# Patient Record
Sex: FEMALE | Race: WHITE | NOT HISPANIC OR LATINO
[De-identification: names, ages, dates, MRNs, and addresses within clinical notes are randomized per-mention and may not be internally consistent; named-entity substitution may affect disease eponyms.]

---

## 2018-09-11 ENCOUNTER — RECORDS - HEALTHEAST (OUTPATIENT)
Dept: ADMINISTRATIVE | Facility: OTHER | Age: 83
End: 2018-09-11

## 2018-10-11 ENCOUNTER — OFFICE VISIT - HEALTHEAST (OUTPATIENT)
Dept: ALLERGY | Facility: CLINIC | Age: 83
End: 2018-10-11

## 2018-10-11 DIAGNOSIS — L29.9 ITCHING: ICD-10-CM

## 2018-11-01 ENCOUNTER — OFFICE VISIT - HEALTHEAST (OUTPATIENT)
Dept: ALLERGY | Facility: CLINIC | Age: 83
End: 2018-11-01

## 2018-11-01 DIAGNOSIS — L30.9 ECZEMA: ICD-10-CM

## 2018-11-01 DIAGNOSIS — L29.9 PRURITUS: ICD-10-CM

## 2018-11-01 ASSESSMENT — MIFFLIN-ST. JEOR: SCORE: 827.41

## 2019-02-11 ENCOUNTER — RECORDS - HEALTHEAST (OUTPATIENT)
Dept: LAB | Facility: CLINIC | Age: 84
End: 2019-02-11

## 2019-02-11 LAB
ANION GAP SERPL CALCULATED.3IONS-SCNC: 6 MMOL/L (ref 5–18)
BUN SERPL-MCNC: 14 MG/DL (ref 8–28)
CALCIUM SERPL-MCNC: 8.3 MG/DL (ref 8.5–10.5)
CHLORIDE BLD-SCNC: 105 MMOL/L (ref 98–107)
CO2 SERPL-SCNC: 27 MMOL/L (ref 22–31)
CREAT SERPL-MCNC: 0.59 MG/DL (ref 0.6–1.1)
GFR SERPL CREATININE-BSD FRML MDRD: >60 ML/MIN/1.73M2
GLUCOSE BLD-MCNC: 77 MG/DL (ref 70–125)
MAGNESIUM SERPL-MCNC: 1.8 MG/DL (ref 1.8–2.6)
POTASSIUM BLD-SCNC: 3.7 MMOL/L (ref 3.5–5)
SODIUM SERPL-SCNC: 138 MMOL/L (ref 136–145)

## 2019-02-12 ENCOUNTER — RECORDS - HEALTHEAST (OUTPATIENT)
Dept: LAB | Facility: CLINIC | Age: 84
End: 2019-02-12

## 2019-02-13 LAB — POTASSIUM BLD-SCNC: 2.9 MMOL/L (ref 3.5–5)

## 2019-02-14 ENCOUNTER — RECORDS - HEALTHEAST (OUTPATIENT)
Dept: LAB | Facility: CLINIC | Age: 84
End: 2019-02-14

## 2019-02-15 LAB
ANION GAP SERPL CALCULATED.3IONS-SCNC: 10 MMOL/L (ref 5–18)
BUN SERPL-MCNC: 26 MG/DL (ref 8–28)
CALCIUM SERPL-MCNC: 9.6 MG/DL (ref 8.5–10.5)
CHLORIDE BLD-SCNC: 94 MMOL/L (ref 98–107)
CO2 SERPL-SCNC: 32 MMOL/L (ref 22–31)
CREAT SERPL-MCNC: 0.74 MG/DL (ref 0.6–1.1)
GFR SERPL CREATININE-BSD FRML MDRD: >60 ML/MIN/1.73M2
GLUCOSE BLD-MCNC: 86 MG/DL (ref 70–125)
POTASSIUM BLD-SCNC: 4 MMOL/L (ref 3.5–5)
SODIUM SERPL-SCNC: 136 MMOL/L (ref 136–145)

## 2019-02-18 ENCOUNTER — RECORDS - HEALTHEAST (OUTPATIENT)
Dept: LAB | Facility: CLINIC | Age: 84
End: 2019-02-18

## 2019-02-18 LAB
ANION GAP SERPL CALCULATED.3IONS-SCNC: 12 MMOL/L (ref 5–18)
BUN SERPL-MCNC: 30 MG/DL (ref 8–28)
CALCIUM SERPL-MCNC: 10 MG/DL (ref 8.5–10.5)
CHLORIDE BLD-SCNC: 81 MMOL/L (ref 98–107)
CO2 SERPL-SCNC: 39 MMOL/L (ref 22–31)
CREAT SERPL-MCNC: 0.81 MG/DL (ref 0.6–1.1)
GFR SERPL CREATININE-BSD FRML MDRD: >60 ML/MIN/1.73M2
GLUCOSE BLD-MCNC: 83 MG/DL (ref 70–125)
POTASSIUM BLD-SCNC: 3.6 MMOL/L (ref 3.5–5)
SODIUM SERPL-SCNC: 132 MMOL/L (ref 136–145)

## 2019-02-20 ENCOUNTER — RECORDS - HEALTHEAST (OUTPATIENT)
Dept: LAB | Facility: CLINIC | Age: 84
End: 2019-02-20

## 2019-02-20 LAB — POTASSIUM BLD-SCNC: 3.3 MMOL/L (ref 3.5–5)

## 2019-02-21 ENCOUNTER — RECORDS - HEALTHEAST (OUTPATIENT)
Dept: LAB | Facility: CLINIC | Age: 84
End: 2019-02-21

## 2019-02-21 LAB
ANION GAP SERPL CALCULATED.3IONS-SCNC: 19 MMOL/L (ref 5–18)
BUN SERPL-MCNC: 64 MG/DL (ref 8–28)
CALCIUM SERPL-MCNC: 10.7 MG/DL (ref 8.5–10.5)
CHLORIDE BLD-SCNC: 76 MMOL/L (ref 98–107)
CO2 SERPL-SCNC: 39 MMOL/L (ref 22–31)
CREAT SERPL-MCNC: 1.32 MG/DL (ref 0.6–1.1)
ERYTHROCYTE [DISTWIDTH] IN BLOOD BY AUTOMATED COUNT: 16.5 % (ref 11–14.5)
GFR SERPL CREATININE-BSD FRML MDRD: 38 ML/MIN/1.73M2
GLUCOSE BLD-MCNC: 103 MG/DL (ref 70–125)
HCT VFR BLD AUTO: 45.9 % (ref 35–47)
HGB BLD-MCNC: 14.9 G/DL (ref 12–16)
MCH RBC QN AUTO: 31.1 PG (ref 27–34)
MCHC RBC AUTO-ENTMCNC: 32.5 G/DL (ref 32–36)
MCV RBC AUTO: 96 FL (ref 80–100)
PLATELET # BLD AUTO: 353 THOU/UL (ref 140–440)
PMV BLD AUTO: 10.8 FL (ref 8.5–12.5)
POTASSIUM BLD-SCNC: 3.5 MMOL/L (ref 3.5–5)
RBC # BLD AUTO: 4.79 MILL/UL (ref 3.8–5.4)
SODIUM SERPL-SCNC: 134 MMOL/L (ref 136–145)
WBC: 13.1 THOU/UL (ref 4–11)

## 2019-02-22 ENCOUNTER — RECORDS - HEALTHEAST (OUTPATIENT)
Dept: LAB | Facility: CLINIC | Age: 84
End: 2019-02-22

## 2019-02-22 LAB
ALBUMIN UR-MCNC: ABNORMAL MG/DL
APPEARANCE UR: ABNORMAL
BACTERIA #/AREA URNS HPF: ABNORMAL HPF
BILIRUB UR QL STRIP: NEGATIVE
C DIFF TOX B STL QL: NEGATIVE
COLOR UR AUTO: YELLOW
GLUCOSE UR STRIP-MCNC: NEGATIVE MG/DL
HGB UR QL STRIP: NEGATIVE
KETONES UR STRIP-MCNC: NEGATIVE MG/DL
LEUKOCYTE ESTERASE UR QL STRIP: ABNORMAL
NITRATE UR QL: NEGATIVE
PH UR STRIP: 8.5 [PH] (ref 4.5–8)
POTASSIUM BLD-SCNC: 2.7 MMOL/L (ref 3.5–5)
RBC #/AREA URNS AUTO: ABNORMAL HPF
RIBOTYPE 027/NAP1/BI: NORMAL
SP GR UR STRIP: 1.01 (ref 1–1.03)
SQUAMOUS #/AREA URNS AUTO: ABNORMAL LPF
TRANS CELLS #/AREA URNS HPF: ABNORMAL LPF
UROBILINOGEN UR STRIP-ACNC: ABNORMAL
WBC #/AREA URNS AUTO: ABNORMAL HPF

## 2019-02-24 LAB
BACTERIA SPEC CULT: ABNORMAL
BACTERIA SPEC CULT: ABNORMAL

## 2019-03-31 ENCOUNTER — RECORDS - HEALTHEAST (OUTPATIENT)
Dept: LAB | Facility: CLINIC | Age: 84
End: 2019-03-31

## 2019-03-31 LAB
ALBUMIN UR-MCNC: ABNORMAL MG/DL
AMORPH CRY #/AREA URNS HPF: ABNORMAL /[HPF]
APPEARANCE UR: ABNORMAL
BACTERIA #/AREA URNS HPF: ABNORMAL HPF
BILIRUB UR QL STRIP: NEGATIVE
COLOR UR AUTO: YELLOW
GLUCOSE UR STRIP-MCNC: NEGATIVE MG/DL
HGB UR QL STRIP: ABNORMAL
KETONES UR STRIP-MCNC: NEGATIVE MG/DL
LEUKOCYTE ESTERASE UR QL STRIP: ABNORMAL
MUCOUS THREADS #/AREA URNS LPF: ABNORMAL LPF
NITRATE UR QL: POSITIVE
PH UR STRIP: 8.5 [PH] (ref 4.5–8)
RBC #/AREA URNS AUTO: ABNORMAL HPF
RENAL EPI CELLS #/AREA URNS HPF: ABNORMAL LPF
SP GR UR STRIP: 1.02 (ref 1–1.03)
SQUAMOUS #/AREA URNS AUTO: ABNORMAL LPF
TRANS CELLS #/AREA URNS HPF: ABNORMAL LPF
UROBILINOGEN UR STRIP-ACNC: ABNORMAL
WBC #/AREA URNS AUTO: >100 HPF
WBC CLUMPS #/AREA URNS HPF: PRESENT /[HPF]

## 2019-04-02 LAB — BACTERIA SPEC CULT: ABNORMAL

## 2019-04-04 ENCOUNTER — RECORDS - HEALTHEAST (OUTPATIENT)
Dept: LAB | Facility: CLINIC | Age: 84
End: 2019-04-04

## 2019-04-05 LAB
ALBUMIN SERPL-MCNC: 3.3 G/DL (ref 3.5–5)
ALP SERPL-CCNC: 103 U/L (ref 45–120)
ALT SERPL W P-5'-P-CCNC: 14 U/L (ref 0–45)
ANION GAP SERPL CALCULATED.3IONS-SCNC: 11 MMOL/L (ref 5–18)
AST SERPL W P-5'-P-CCNC: 25 U/L (ref 0–40)
BILIRUB DIRECT SERPL-MCNC: 0.1 MG/DL
BILIRUB SERPL-MCNC: 0.3 MG/DL (ref 0–1)
BUN SERPL-MCNC: 21 MG/DL (ref 8–28)
CALCIUM SERPL-MCNC: 9.4 MG/DL (ref 8.5–10.5)
CHLORIDE BLD-SCNC: 98 MMOL/L (ref 98–107)
CO2 SERPL-SCNC: 27 MMOL/L (ref 22–31)
CREAT SERPL-MCNC: 0.84 MG/DL (ref 0.6–1.1)
ERYTHROCYTE [DISTWIDTH] IN BLOOD BY AUTOMATED COUNT: 15.7 % (ref 11–14.5)
GFR SERPL CREATININE-BSD FRML MDRD: >60 ML/MIN/1.73M2
GLUCOSE BLD-MCNC: 79 MG/DL (ref 70–125)
HCT VFR BLD AUTO: 35.6 % (ref 35–47)
HGB BLD-MCNC: 11.5 G/DL (ref 12–16)
MAGNESIUM SERPL-MCNC: 1.8 MG/DL (ref 1.8–2.6)
MCH RBC QN AUTO: 31.9 PG (ref 27–34)
MCHC RBC AUTO-ENTMCNC: 32.3 G/DL (ref 32–36)
MCV RBC AUTO: 99 FL (ref 80–100)
PLATELET # BLD AUTO: 347 THOU/UL (ref 140–440)
PMV BLD AUTO: 10.4 FL (ref 8.5–12.5)
POTASSIUM BLD-SCNC: 3.8 MMOL/L (ref 3.5–5)
PROT SERPL-MCNC: 6.7 G/DL (ref 6–8)
RBC # BLD AUTO: 3.61 MILL/UL (ref 3.8–5.4)
SODIUM SERPL-SCNC: 136 MMOL/L (ref 136–145)
TSH SERPL DL<=0.005 MIU/L-ACNC: 3.56 UIU/ML (ref 0.3–5)
VIT B12 SERPL-MCNC: 591 PG/ML (ref 213–816)
WBC: 8.5 THOU/UL (ref 4–11)

## 2019-06-13 ENCOUNTER — RECORDS - HEALTHEAST (OUTPATIENT)
Dept: LAB | Facility: CLINIC | Age: 84
End: 2019-06-13

## 2019-06-13 LAB — ALBUMIN SERPL-MCNC: 3.1 G/DL (ref 3.5–5)

## 2019-07-31 ENCOUNTER — RECORDS - HEALTHEAST (OUTPATIENT)
Dept: ADMINISTRATIVE | Facility: OTHER | Age: 84
End: 2019-07-31

## 2019-08-03 ENCOUNTER — RECORDS - HEALTHEAST (OUTPATIENT)
Dept: LAB | Facility: CLINIC | Age: 84
End: 2019-08-03

## 2019-08-03 LAB
ALBUMIN UR-MCNC: ABNORMAL MG/DL
AMORPH CRY #/AREA URNS HPF: ABNORMAL /[HPF]
APPEARANCE UR: ABNORMAL
BACTERIA #/AREA URNS HPF: ABNORMAL HPF
BILIRUB UR QL STRIP: NEGATIVE
COLOR UR AUTO: YELLOW
GLUCOSE UR STRIP-MCNC: NEGATIVE MG/DL
HGB UR QL STRIP: ABNORMAL
KETONES UR STRIP-MCNC: NEGATIVE MG/DL
LEUKOCYTE ESTERASE UR QL STRIP: ABNORMAL
MUCOUS THREADS #/AREA URNS LPF: ABNORMAL LPF
NITRATE UR QL: POSITIVE
PH UR STRIP: 8.5 [PH] (ref 4.5–8)
RBC #/AREA URNS AUTO: ABNORMAL HPF
SP GR UR STRIP: 1.02 (ref 1–1.03)
SQUAMOUS #/AREA URNS AUTO: ABNORMAL LPF
TRI-PHOS CRY #/AREA URNS HPF: PRESENT /[HPF]
UROBILINOGEN UR STRIP-ACNC: ABNORMAL
WBC #/AREA URNS AUTO: ABNORMAL HPF
WBC CLUMPS #/AREA URNS HPF: PRESENT /[HPF]

## 2019-08-05 LAB — BACTERIA SPEC CULT: ABNORMAL

## 2019-09-12 ENCOUNTER — RECORDS - HEALTHEAST (OUTPATIENT)
Dept: LAB | Facility: CLINIC | Age: 84
End: 2019-09-12

## 2019-09-12 LAB
ANION GAP SERPL CALCULATED.3IONS-SCNC: 14 MMOL/L (ref 5–18)
BUN SERPL-MCNC: 33 MG/DL (ref 8–28)
CALCIUM SERPL-MCNC: 10.3 MG/DL (ref 8.5–10.5)
CHLORIDE BLD-SCNC: 91 MMOL/L (ref 98–107)
CO2 SERPL-SCNC: 36 MMOL/L (ref 22–31)
CREAT SERPL-MCNC: 1.07 MG/DL (ref 0.6–1.1)
ERYTHROCYTE [DISTWIDTH] IN BLOOD BY AUTOMATED COUNT: 15.2 % (ref 11–14.5)
GFR SERPL CREATININE-BSD FRML MDRD: 48 ML/MIN/1.73M2
GLUCOSE BLD-MCNC: 116 MG/DL (ref 70–125)
HCT VFR BLD AUTO: 43.8 % (ref 35–47)
HGB BLD-MCNC: 13.7 G/DL (ref 12–16)
MAGNESIUM SERPL-MCNC: 2.4 MG/DL (ref 1.8–2.6)
MCH RBC QN AUTO: 32.1 PG (ref 27–34)
MCHC RBC AUTO-ENTMCNC: 31.3 G/DL (ref 32–36)
MCV RBC AUTO: 103 FL (ref 80–100)
PLATELET # BLD AUTO: 367 THOU/UL (ref 140–440)
PMV BLD AUTO: 10.4 FL (ref 8.5–12.5)
POTASSIUM BLD-SCNC: 3.2 MMOL/L (ref 3.5–5)
RBC # BLD AUTO: 4.27 MILL/UL (ref 3.8–5.4)
SODIUM SERPL-SCNC: 141 MMOL/L (ref 136–145)
TSH SERPL DL<=0.005 MIU/L-ACNC: 1.93 UIU/ML (ref 0.3–5)
VIT B12 SERPL-MCNC: >2000 PG/ML (ref 213–816)
WBC: 10.9 THOU/UL (ref 4–11)

## 2019-10-29 ENCOUNTER — RECORDS - HEALTHEAST (OUTPATIENT)
Dept: LAB | Facility: CLINIC | Age: 84
End: 2019-10-29

## 2019-10-29 LAB
ANION GAP SERPL CALCULATED.3IONS-SCNC: 16 MMOL/L (ref 5–18)
BUN SERPL-MCNC: 45 MG/DL (ref 8–28)
CALCIUM SERPL-MCNC: 9.5 MG/DL (ref 8.5–10.5)
CHLORIDE BLD-SCNC: 80 MMOL/L (ref 98–107)
CO2 SERPL-SCNC: 41 MMOL/L (ref 22–31)
CREAT SERPL-MCNC: 1.35 MG/DL (ref 0.6–1.1)
GFR SERPL CREATININE-BSD FRML MDRD: 37 ML/MIN/1.73M2
GLUCOSE BLD-MCNC: 132 MG/DL (ref 70–125)
POTASSIUM BLD-SCNC: 3 MMOL/L (ref 3.5–5)
SODIUM SERPL-SCNC: 137 MMOL/L (ref 136–145)

## 2020-06-16 ENCOUNTER — RECORDS - HEALTHEAST (OUTPATIENT)
Dept: ADMINISTRATIVE | Facility: OTHER | Age: 85
End: 2020-06-16
Payer: MEDICARE

## 2020-06-25 ENCOUNTER — RECORDS - HEALTHEAST (OUTPATIENT)
Dept: LAB | Facility: CLINIC | Age: 85
End: 2020-06-25

## 2020-06-26 LAB
ANION GAP SERPL CALCULATED.3IONS-SCNC: 14 MMOL/L (ref 5–18)
BUN SERPL-MCNC: 48 MG/DL (ref 8–28)
CALCIUM SERPL-MCNC: 9.8 MG/DL (ref 8.5–10.5)
CHLORIDE BLD-SCNC: 83 MMOL/L (ref 98–107)
CO2 SERPL-SCNC: 38 MMOL/L (ref 22–31)
CREAT SERPL-MCNC: 1.69 MG/DL (ref 0.6–1.1)
ERYTHROCYTE [DISTWIDTH] IN BLOOD BY AUTOMATED COUNT: 14.4 % (ref 11–14.5)
GFR SERPL CREATININE-BSD FRML MDRD: 28 ML/MIN/1.73M2
GLUCOSE BLD-MCNC: 97 MG/DL (ref 70–125)
HCT VFR BLD AUTO: 37.6 % (ref 35–47)
HGB BLD-MCNC: 11.8 G/DL (ref 12–16)
MAGNESIUM SERPL-MCNC: 3.4 MG/DL (ref 1.8–2.6)
MCH RBC QN AUTO: 31.6 PG (ref 27–34)
MCHC RBC AUTO-ENTMCNC: 31.4 G/DL (ref 32–36)
MCV RBC AUTO: 101 FL (ref 80–100)
PLATELET # BLD AUTO: 456 THOU/UL (ref 140–440)
PMV BLD AUTO: 9.6 FL (ref 8.5–12.5)
POTASSIUM BLD-SCNC: 2.8 MMOL/L (ref 3.5–5)
RBC # BLD AUTO: 3.73 MILL/UL (ref 3.8–5.4)
SODIUM SERPL-SCNC: 135 MMOL/L (ref 136–145)
TSH SERPL DL<=0.005 MIU/L-ACNC: 2.11 UIU/ML (ref 0.3–5)
VIT B12 SERPL-MCNC: 1831 PG/ML (ref 213–816)
WBC: 10.4 THOU/UL (ref 4–11)

## 2020-07-02 ENCOUNTER — RECORDS - HEALTHEAST (OUTPATIENT)
Dept: LAB | Facility: CLINIC | Age: 85
End: 2020-07-02

## 2020-07-03 LAB
ANION GAP SERPL CALCULATED.3IONS-SCNC: 10 MMOL/L (ref 5–18)
BUN SERPL-MCNC: 27 MG/DL (ref 8–28)
CALCIUM SERPL-MCNC: 9.6 MG/DL (ref 8.5–10.5)
CHLORIDE BLD-SCNC: 90 MMOL/L (ref 98–107)
CO2 SERPL-SCNC: 36 MMOL/L (ref 22–31)
CREAT SERPL-MCNC: 0.98 MG/DL (ref 0.6–1.1)
GFR SERPL CREATININE-BSD FRML MDRD: 53 ML/MIN/1.73M2
GLUCOSE BLD-MCNC: 80 MG/DL (ref 70–125)
POTASSIUM BLD-SCNC: 2.7 MMOL/L (ref 3.5–5)
SODIUM SERPL-SCNC: 136 MMOL/L (ref 136–145)

## 2020-07-05 LAB — ZINC SERPL-MCNC: 80.8 UG/DL (ref 60–120)

## 2020-07-09 ENCOUNTER — RECORDS - HEALTHEAST (OUTPATIENT)
Dept: LAB | Facility: CLINIC | Age: 85
End: 2020-07-09

## 2020-07-10 LAB — POTASSIUM BLD-SCNC: 2.3 MMOL/L (ref 3.5–5)

## 2020-07-25 ENCOUNTER — RECORDS - HEALTHEAST (OUTPATIENT)
Dept: LAB | Facility: CLINIC | Age: 85
End: 2020-07-25

## 2020-07-27 LAB — POTASSIUM BLD-SCNC: 3.8 MMOL/L (ref 3.5–5)

## 2020-08-07 ENCOUNTER — RECORDS - HEALTHEAST (OUTPATIENT)
Dept: ADMINISTRATIVE | Facility: OTHER | Age: 85
End: 2020-08-07

## 2020-08-07 ENCOUNTER — AMBULATORY - HEALTHEAST (OUTPATIENT)
Dept: VASCULAR SURGERY | Facility: CLINIC | Age: 85
End: 2020-08-07

## 2020-08-07 DIAGNOSIS — S81.809A WOUND OF LOWER EXTREMITY: ICD-10-CM

## 2020-08-24 ENCOUNTER — COMMUNICATION - HEALTHEAST (OUTPATIENT)
Dept: VASCULAR SURGERY | Facility: CLINIC | Age: 85
End: 2020-08-24

## 2020-08-24 ENCOUNTER — OFFICE VISIT - HEALTHEAST (OUTPATIENT)
Dept: VASCULAR SURGERY | Facility: CLINIC | Age: 85
End: 2020-08-24

## 2020-08-24 DIAGNOSIS — L97.921 SKIN ULCER OF LEFT LOWER LEG, LIMITED TO BREAKDOWN OF SKIN (H): ICD-10-CM

## 2020-08-24 ASSESSMENT — MIFFLIN-ST. JEOR: SCORE: 797.36

## 2020-09-03 ENCOUNTER — COMMUNICATION - HEALTHEAST (OUTPATIENT)
Dept: VASCULAR SURGERY | Facility: CLINIC | Age: 85
End: 2020-09-03

## 2020-09-09 ENCOUNTER — RECORDS - HEALTHEAST (OUTPATIENT)
Dept: LAB | Facility: CLINIC | Age: 85
End: 2020-09-09

## 2020-09-10 LAB — POTASSIUM BLD-SCNC: 5 MMOL/L (ref 3.5–5)

## 2020-09-16 ENCOUNTER — OFFICE VISIT - HEALTHEAST (OUTPATIENT)
Dept: VASCULAR SURGERY | Facility: CLINIC | Age: 85
End: 2020-09-16

## 2020-09-16 DIAGNOSIS — L97.921 SKIN ULCER OF LEFT LOWER LEG, LIMITED TO BREAKDOWN OF SKIN (H): ICD-10-CM

## 2020-09-23 ENCOUNTER — OFFICE VISIT - HEALTHEAST (OUTPATIENT)
Dept: VASCULAR SURGERY | Facility: CLINIC | Age: 85
End: 2020-09-23

## 2020-09-23 DIAGNOSIS — E88.09 HYPOALBUMINEMIA: ICD-10-CM

## 2020-09-23 DIAGNOSIS — R63.6 UNDERWEIGHT: ICD-10-CM

## 2020-09-23 DIAGNOSIS — S81.801D TRAUMATIC OPEN WOUND OF LOWER LEG, RIGHT, SUBSEQUENT ENCOUNTER: ICD-10-CM

## 2020-09-23 DIAGNOSIS — L97.921 SKIN ULCER OF LEFT LOWER LEG, LIMITED TO BREAKDOWN OF SKIN (H): ICD-10-CM

## 2020-09-23 DIAGNOSIS — R54 ADVANCED AGE: ICD-10-CM

## 2020-09-25 ENCOUNTER — RECORDS - HEALTHEAST (OUTPATIENT)
Dept: LAB | Facility: CLINIC | Age: 85
End: 2020-09-25

## 2020-09-28 LAB — POTASSIUM BLD-SCNC: 3.2 MMOL/L (ref 3.5–5)

## 2020-10-08 ENCOUNTER — RECORDS - HEALTHEAST (OUTPATIENT)
Dept: LAB | Facility: CLINIC | Age: 85
End: 2020-10-08

## 2020-10-09 LAB — POTASSIUM BLD-SCNC: 4 MMOL/L (ref 3.5–5)

## 2020-10-14 ENCOUNTER — RECORDS - HEALTHEAST (OUTPATIENT)
Dept: LAB | Facility: CLINIC | Age: 85
End: 2020-10-14

## 2020-10-15 LAB — POTASSIUM BLD-SCNC: 3.8 MMOL/L (ref 3.5–5)

## 2020-10-23 ENCOUNTER — OFFICE VISIT - HEALTHEAST (OUTPATIENT)
Dept: VASCULAR SURGERY | Facility: CLINIC | Age: 85
End: 2020-10-23

## 2020-10-23 DIAGNOSIS — L97.921 SKIN ULCER OF LEFT LOWER LEG, LIMITED TO BREAKDOWN OF SKIN (H): ICD-10-CM

## 2020-10-23 DIAGNOSIS — E88.09 HYPOALBUMINEMIA: ICD-10-CM

## 2020-10-23 DIAGNOSIS — R54 ADVANCED AGE: ICD-10-CM

## 2020-10-23 DIAGNOSIS — S81.801D TRAUMATIC OPEN WOUND OF LOWER LEG, RIGHT, SUBSEQUENT ENCOUNTER: ICD-10-CM

## 2020-10-23 DIAGNOSIS — R63.6 UNDERWEIGHT: ICD-10-CM

## 2020-12-16 ENCOUNTER — RECORDS - HEALTHEAST (OUTPATIENT)
Dept: LAB | Facility: CLINIC | Age: 85
End: 2020-12-16

## 2020-12-17 LAB — POTASSIUM BLD-SCNC: 3.1 MMOL/L (ref 3.5–5)

## 2021-01-21 ENCOUNTER — RECORDS - HEALTHEAST (OUTPATIENT)
Dept: LAB | Facility: CLINIC | Age: 86
End: 2021-01-21

## 2021-01-22 LAB — POTASSIUM BLD-SCNC: 3.3 MMOL/L (ref 3.5–5)

## 2021-03-25 ENCOUNTER — RECORDS - HEALTHEAST (OUTPATIENT)
Dept: ADMINISTRATIVE | Facility: OTHER | Age: 86
End: 2021-03-25

## 2021-03-25 ENCOUNTER — RECORDS - HEALTHEAST (OUTPATIENT)
Dept: LAB | Facility: CLINIC | Age: 86
End: 2021-03-25

## 2021-03-26 LAB — POTASSIUM BLD-SCNC: 2.9 MMOL/L (ref 3.5–5)

## 2021-03-30 ENCOUNTER — RECORDS - HEALTHEAST (OUTPATIENT)
Dept: LAB | Facility: CLINIC | Age: 86
End: 2021-03-30

## 2021-04-01 LAB — POTASSIUM BLD-SCNC: 2.7 MMOL/L (ref 3.5–5)

## 2021-04-02 ENCOUNTER — RECORDS - HEALTHEAST (OUTPATIENT)
Dept: LAB | Facility: CLINIC | Age: 86
End: 2021-04-02

## 2021-04-05 LAB — POTASSIUM BLD-SCNC: 2.6 MMOL/L (ref 3.5–5)

## 2021-05-05 ENCOUNTER — RECORDS - HEALTHEAST (OUTPATIENT)
Dept: LAB | Facility: CLINIC | Age: 86
End: 2021-05-05

## 2021-05-06 LAB — POTASSIUM BLD-SCNC: 3.4 MMOL/L (ref 3.5–5)

## 2021-05-21 ENCOUNTER — RECORDS - HEALTHEAST (OUTPATIENT)
Dept: ADMINISTRATIVE | Facility: OTHER | Age: 86
End: 2021-05-21

## 2021-05-26 VITALS — SYSTOLIC BLOOD PRESSURE: 116 MMHG | DIASTOLIC BLOOD PRESSURE: 62 MMHG

## 2021-06-02 VITALS — BODY MASS INDEX: 18.58 KG/M2 | WEIGHT: 101 LBS | HEIGHT: 62 IN

## 2021-06-02 VITALS — WEIGHT: 101.1 LBS | BODY MASS INDEX: 16.95 KG/M2

## 2021-06-04 VITALS
HEIGHT: 62 IN | BODY MASS INDEX: 17.21 KG/M2 | WEIGHT: 93.5 LBS | DIASTOLIC BLOOD PRESSURE: 50 MMHG | HEART RATE: 88 BPM | RESPIRATION RATE: 14 BRPM | TEMPERATURE: 98.1 F | SYSTOLIC BLOOD PRESSURE: 106 MMHG

## 2021-06-05 VITALS
TEMPERATURE: 99 F | RESPIRATION RATE: 14 BRPM | BODY MASS INDEX: 16.1 KG/M2 | SYSTOLIC BLOOD PRESSURE: 126 MMHG | WEIGHT: 88 LBS | HEART RATE: 88 BPM | DIASTOLIC BLOOD PRESSURE: 66 MMHG

## 2021-06-05 VITALS
HEART RATE: 84 BPM | RESPIRATION RATE: 16 BRPM | TEMPERATURE: 97.8 F | DIASTOLIC BLOOD PRESSURE: 68 MMHG | BODY MASS INDEX: 17.88 KG/M2 | SYSTOLIC BLOOD PRESSURE: 118 MMHG | WEIGHT: 97.78 LBS

## 2021-06-10 NOTE — TELEPHONE ENCOUNTER
Writer called pt but could only leave a message for a call back to schedule a 40 min consult with Hazel Cho CNP. Phone number provided.

## 2021-06-10 NOTE — TELEPHONE ENCOUNTER
----- Message from Carolyn Randle RN sent at 8/24/2020  2:25 PM CDT -----  Regarding: Schedule  Please schedule patient with Hazel Cho per VS.  He will follow patient until this can be set up. Thanks.

## 2021-06-10 NOTE — PATIENT INSTRUCTIONS - HE
Limited weightbearing on left foot.    Start Santyl on left posterior leg per directions below:    How to Use Collagenase  SANTYL  Ointment    DAILY CLEANSE  Gently cleanse the wound with sterile saline    APPLY  Apply SANTYL  Ointment at the thickness of a nickel or the thickness of the cream inside an Oreo cookie    COVER  Cover the wound with a clean moistened gauze followed by dry gauze if wound bed is dry. Wounds with sufficient exudate will naturally activate the collagenase enzyme and do not need a moistened gauze applied. Do not use dressings that contain silver or iodine with SANTYL  Ointment, as they may stop SANTYL  Ointment       [facility] Virtual Check-In Number    [phone]    When you arrive for your IN OFFICE visit. Please call this number from the parking lot.      The staff will then virtually check you in and meet you at the door to escort you to a room.    If you arrive early and a room is not yet ready for you staff may have you wait can call you back when they are ready.     Please remember to wear a mask into your appointment     No Visitors Allowed    If you are having any symptoms- cough, fever, rash, loss of taste and smell or shortness of breath we ask that you please call and reschedule your appointment.

## 2021-06-11 NOTE — TELEPHONE ENCOUNTER
Writer spoke to pt today to assist scheduling an appt with Hazel Cho CNP. Pt will be seeing Hazel Cho October 5th and has been placed on the cancelation list. Pt thanked writer for the call today.

## 2021-06-11 NOTE — TELEPHONE ENCOUNTER
Spoke to Karina. They needed the wound size in order to determine how much Santyl to provide with first dispensing. Provided wound measurements. They will adjust the amount to dispense from 30 gms to 90 gms with 2 additional refills. No further information is needed.

## 2021-06-11 NOTE — PATIENT INSTRUCTIONS - HE
Limited weightbearing on left foot.     Continue Santyl on left posterior leg and start santyl on right anterior leg per directions below:     How to Use Collagenase SANTYL  Ointment DAILY CLEANSE   1. Gently cleanse the wound with sterile saline   2. APPLY Apply SANTYL  Ointment at the thickness of a nickel or the thickness of the cream inside an Oreo cookie   3. COVER Cover the wound with a clean moistened gauze followed by dry gauze if wound bed is dry. Wounds with sufficient exudate will naturally activate the collagenase enzyme and do not need a moistened gauze applied. 4. Do not use dressings that contain silver or iodine with SANTYL  Ointment, as they may stop SANTYL  Ointment         [facility] Virtual Check-In Number    [phone]    When you arrive for your IN OFFICE visit. Please call this number from the parking lot.      The staff will then virtually check you in and meet you at the door to escort you to a room.    If you arrive early and a room is not yet ready for you staff may have you wait can call you back when they are ready.     Please remember to wear a mask into your appointment     No Visitors Allowed    If you are having any symptoms- cough, fever, rash, loss of taste and smell or shortness of breath we ask that you please call and reschedule your appointment.

## 2021-06-11 NOTE — PROGRESS NOTES
FOOT AND ANKLE SURGERY/PODIATRY Progress Note      ASSESSMENT:   Ulceration bilateral legs      A new wound was identified today: yes,  it is located right leg.  TREATMENT:  -Bilateral leg ulcerations have fibrotic tissue. I recommend she continue with santyl at both locations with limited walking.    -After discussion of risk factors and consent obtained 2% Lidocaine HCL jelly was applied, under clean conditions, the bilateral and calf ulceration(s) were debrided using #15 blade scalpel.  Devitalized and nonviable tissue, along with any fibrin and slough, was removed to improve granulation tissue formation, stimulate wound healing, decrease overall bacteria load, disrupt biofilm formation and decrease edge senescence.  Total excisional debridement was 11 sq cm into the subcutaneous tissue with a depth of 0.2 cm.   Ulcers were improved afterwards and .  Measures were as noted on the flow sheet. Patient tolerated this well. A gauze dressing was applied.     -She will follow-up with Hazel Cho in 2-3 weeks for continued cares.     Kemal Vivar DPM  [facility] [place]      HPI: Ariana Felix was seen again today for a left leg ulceration. She has been using santyl as directed and reports having an ulceration on her right leg which she did not mention at her last visit. Currently applying medihoney to her right leg ulcer.       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 per [facility] 5-8-18     Fracture of unspecified part of left clavicle, initial encounter for closed fracture 11/12/2018     GERD (gastroesophageal reflux disease) 11/26/2018     Hypercholesterolemia 11/26/2018      Hyperparathyroidism (H) 4/15/2008     Hypokalemia 11/26/2018     Hyponatremia 12/19/2017     Lactose intolerance 11/26/2018     Major depression 11/26/2018     Mast cell activation, 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 BMI 15.64.     Varicose vein 11/26/2018    Overview:  painful     Zinc deficiency 4/30/2018       Past Surgical History:   Procedure Laterality Date     APPENDECTOMY       HIP SURGERY Right 2008     TONSILLECTOMY         Allergies   Allergen Reactions     Milk Cough     Codeine Other (See Comments)     Hx of narcolepsy     Ibandronate Other (See Comments)     pain     Morphine Other (See Comments)     swelling     Adhesive Tape-Silicones Rash     Alendronate Other (See Comments) and Rash     esophagitis  esophagitis     Banana Extract Rash     Bee Venom Protein (Honey Bee) Other (See Comments)     Swelling     Citrus And Derivatives Rash     Cocoa Rash     Gluten Rash     Kiwi Rash     Latex Rash     Lidocaine Rash     Patch      Other Allergy (See Comments) Rash     Penicillins Rash and Other (See Comments)     UNKNOWN - HAD REACTION IN THE PAST     Preservative Rash     PRESERVATIVES     Spice Flavor Rash     spices         Current Outpatient Medications:      acetaminophen (TYLENOL) 500 MG tablet, Take by mouth., Disp: , Rfl:      CALCIUM CARBONATE ORAL, Take 750 mg by mouth. , Disp: , Rfl:      capsaicin (ZOSTRIX) 0.025 % cream, Apply to affected areas every 6-8 hours as needed, Disp: 60 g, Rfl: 3     cetirizine (ZYRTEC) 10 MG chewable tablet, Chew 10 mg., Disp: , Rfl:      citalopram (CELEXA) 20 MG tablet, Take 40 mg by  "mouth. , Disp: , Rfl:      clobetasol (TEMOVATE) 0.05 % external solution, JANN EXT AA D HS, Disp: , Rfl:      crisaborole (EUCRISA) 2 % Oint, Apply 1 application topically 2 (two) times a day., Disp: 60 g, Rfl: 6     cromolyn (GASTROCROM) 100 mg/5 mL solution, Use 5 mL oral solution 2 times daily, Disp: , Rfl:      docusate sodium (COLACE) 100 MG capsule, Take 1 capsule by mouth 3 times daily with meals, Disp: , Rfl:      emollient (VANICREAM) Crea, Apply topically., Disp: , Rfl:      fluocinonide (LIDEX) 0.05 % ointment, Apply topically., Disp: , Rfl:      furosemide (LASIX) 20 MG tablet, Take 20 mg by mouth., Disp: , Rfl:      HYDROcodone-acetaminophen 5-325 mg per tablet, Take 0.5 tablets by mouth every 4 (four) hours as needed for pain., Disp: 10 tablet, Rfl: 0     K-EFFERVESCENT 25 mEq disintegrating tablet, , Disp: , Rfl:      Lacto.acidophilus-Bif.animalis (PROBIOTIC) 5 billion cell CpSP, , Disp: , Rfl:      levothyroxine (SYNTHROID, LEVOTHROID) 25 MCG tablet, 10 mcg., Disp: , Rfl:      linaclotide (LINZESS) 290 mcg cap capsule, , Disp: , Rfl:      methylphenidate HCl (RITALIN) 10 MG tablet, Take 20 mg by mouth., Disp: , Rfl:      potassium chloride (MICRO-K) 10 mEq CR capsule, Take 20 mEq by mouth., Disp: , Rfl:      ranitidine (ZANTAC) 150 MG tablet, Take 150 mg by mouth., Disp: , Rfl:      rivaroxaban (XARELTO) 15 mg Tab, Take 15 mg by mouth., Disp: , Rfl:      senna-docusate (PERICOLACE) 8.6-50 mg tablet, Take by mouth., Disp: , Rfl:      syringe with needle (BD LUER-TRACEE SYRINGE) 3 mL 25 x 5/8\" Syrg, USE FOR B12 INJECTION MONTHLY, Disp: , Rfl:      tamsulosin (FLOMAX) 0.4 mg cap, , Disp: , Rfl:      zinc 50 mg Tab, Take 1 tablet by mouth., Disp: , Rfl:     Current Facility-Administered Medications:      lidocaine 2 % jelly (XYLOCAINE), , Topical, PRN, Kemal Vivar DPM    Review of Systems - 10 point Review of Systems is negative except for bilateral leg ulcerations which is noted in " HPI.      OBJECTIVE:  Vitals:    09/16/20 1340   BP: 116/62     General appearance: Patient is alert and fully cooperative with history & exam.  No sign of distress is noted during the visit.   Vascular: Dorsalis pedis non-palpableBilateral.  Dermatologic:    VASC Wound 08/24/20 left posterior leg (Active)   Pre Size Length 4.6 09/16/20 1300   Pre Size Width 2 09/16/20 1300   Pre Size Depth 0.1 09/16/20 1300   Pre Total Sq cm 9.2 09/16/20 1300   Post Size Length 5 08/24/20 1300   Post Size Width 2.8 08/24/20 1300   Post Size Depth 0.1 08/24/20 1300   Post Total Sq cm 14 08/24/20 1300       VASC Wound 08/24/20 right leg (Active)   Pre Size Length 1.5 08/24/20 1300   Pre Size Width 1 08/24/20 1300   Pre Total Sq cm 1.5 08/24/20 1300   Description scab 08/24/20 1300       Wound 11/12/18 Other wound type (comment) Other (Comment) (Active)   Fibrotic tissue bilateral leg ulcerations. No erythema bilateral.   Neurologic: Intact to light touch Bilateral.  Musculoskeletal: Contracted digits noted Bilateral.    Imaging:   No results found.       Picture: None

## 2021-06-16 PROBLEM — D89.40 MAST CELL ACTIVATION, UNSPECIFIED (H): Status: ACTIVE | Noted: 2017-11-06

## 2021-06-16 PROBLEM — S42.002A FRACTURE OF UNSPECIFIED PART OF LEFT CLAVICLE, INITIAL ENCOUNTER FOR CLOSED FRACTURE: Status: ACTIVE | Noted: 2018-11-12

## 2021-06-16 PROBLEM — N28.9 RENAL INSUFFICIENCY: Status: ACTIVE | Noted: 2018-11-26

## 2021-06-16 PROBLEM — E73.9 LACTOSE INTOLERANCE: Status: ACTIVE | Noted: 2018-11-26

## 2021-06-16 PROBLEM — E60 ZINC DEFICIENCY: Status: ACTIVE | Noted: 2018-04-30

## 2021-06-16 PROBLEM — G47.419 NARCOLEPSY: Status: ACTIVE | Noted: 2018-11-26

## 2021-06-16 PROBLEM — K59.09 CHRONIC CONSTIPATION: Status: ACTIVE | Noted: 2018-11-02

## 2021-06-16 PROBLEM — E87.6 HYPOKALEMIA: Status: ACTIVE | Noted: 2018-11-26

## 2021-06-16 PROBLEM — R74.8 ELEVATED SERUM TRYPTASE: Status: ACTIVE | Noted: 2018-02-26

## 2021-06-16 PROBLEM — E87.1 HYPONATREMIA: Status: ACTIVE | Noted: 2017-12-19

## 2021-06-16 PROBLEM — I83.90 VARICOSE VEIN: Status: ACTIVE | Noted: 2018-11-26

## 2021-06-16 PROBLEM — F41.9 ANXIETY: Status: ACTIVE | Noted: 2017-12-27

## 2021-06-16 PROBLEM — L97.921 SKIN ULCER OF LEFT LOWER LEG, LIMITED TO BREAKDOWN OF SKIN (H): Status: ACTIVE | Noted: 2020-08-24

## 2021-06-16 PROBLEM — E03.9 ACQUIRED HYPOTHYROIDISM: Status: ACTIVE | Noted: 2018-11-26

## 2021-06-16 PROBLEM — L53.9 ERYTHRODERMA: Status: ACTIVE | Noted: 2018-11-26

## 2021-06-16 PROBLEM — F32.9 MAJOR DEPRESSION: Status: ACTIVE | Noted: 2018-11-26

## 2021-06-16 PROBLEM — M81.0 OSTEOPOROSIS: Status: ACTIVE | Noted: 2018-11-26

## 2021-06-16 PROBLEM — K21.9 GERD (GASTROESOPHAGEAL REFLUX DISEASE): Status: ACTIVE | Noted: 2018-11-26

## 2021-06-16 PROBLEM — E78.00 HYPERCHOLESTEROLEMIA: Status: ACTIVE | Noted: 2018-11-26

## 2021-06-16 PROBLEM — E43 SEVERE PROTEIN-ENERGY MALNUTRITION (H): Status: ACTIVE | Noted: 2017-12-27

## 2021-06-18 NOTE — PATIENT INSTRUCTIONS - HE
Patient Instructions by Hazel Cho NP at 9/23/2020 10:45 AM     Author: Hazel Cho NP Service: -- Author Type: Nurse Practitioner    Filed: 9/23/2020 11:28 AM Encounter Date: 9/23/2020 Status: Signed    : Hazel Cho NP (Nurse Practitioner)       Wound Care Instructions    daily home care will Cleanse your bilateral leg wound(s) with Normal Saline    Pat Dry with non-sterile gauze    Apply Lotion to the intact skin surrounding your wound and other dry skin locations. Some good lotions include: Remedy Skin Repair Cream, Sarna, Vanicream or Cetaphil    Apply thick layer of santyl ointment into/onto the wounds    Cover with gauze    Secure with non-sterile roll gauze and tape as needed; avoid adhesive directly on the skin    Compression: no compression; elevation    Keep pressure off the wounds    It is not ok to get your wound wet in the bath or shower    SEEK MEDICAL CARE IF:    You have an increase in swelling, pain, or redness around the wound.    You have an increase in the amount of pus coming from the wound.    There is a bad smell coming from the wound.    The wound appears to be worsening/enlarging    You have a fever greater than 101.5 F        It is ok to continue current wound care treatment/products for the next 2-3 days until new wound care supplies are ordered and arrive. If longer than this please contact our office at [phone].      Hazel Cho DNP, RN, CNP, [facility]  [facility]  [phone]      It is recommended that you do not get your ulcer wet when showering.  Listed below are several ways of keeping it dry when you shower.     1. Wrap it with Press and Seal plastic wrap.  It can be found in the stores where the plastic wraps or tin foil is kept.    2.  Some people take a bath and hang their leg/foot out of the tub.    3  Put your leg in a plastic bag and tape it on.         4. You can purchase a shower cover for casts at some pharmacies and through  the Internet.

## 2021-06-18 NOTE — PATIENT INSTRUCTIONS - HE
Patient Instructions by Hazel Cho NP at 10/23/2020  2:20 PM     Author: Hazel Cho NP Service: -- Author Type: Nurse Practitioner    Filed: 10/23/2020  2:49 PM Encounter Date: 10/23/2020 Status: Addendum    : Hazel Cho NP (Nurse Practitioner)    Related Notes: Original Note by Hazel Cho NP (Nurse Practitioner) filed at 10/23/2020  2:22 PM       OK to apply ABD; rolled gauze to the right shin for protection; no tape on the skin      Wound Care Instructions    Every Monday, Wednesday and Friday home care will Cleanse your left calf and left wound(s) with Normal Saline    Pat Dry with non-sterile gauze    Apply Lotion to the intact skin surrounding your wound and other dry skin locations. Some good lotions include: Remedy Skin Repair Cream, Sarna, Vanicream or Cetaphil    Apply silvercel into/onto the wounds    Cover with gauze    Secure with non-sterile roll gauze and tape as needed; avoid adhesive directly on the skin    Compression: no compression; elevation    Keep pressure off the wounds    It is not ok to get your wound wet in the bath or shower    SEEK MEDICAL CARE IF:    You have an increase in swelling, pain, or redness around the wound.    You have an increase in the amount of pus coming from the wound.    There is a bad smell coming from the wound.    The wound appears to be worsening/enlarging    You have a fever greater than 101.5 F        It is ok to continue current wound care treatment/products for the next 2-3 days until new wound care supplies are ordered and arrive. If longer than this please contact our office at [phone].      Hazel Cho DNP, RN, CNP, [facility]  [phone]      It is recommended that you do not get your ulcer wet when showering.  Listed below are several ways of keeping it dry when you shower.     1. Wrap it with Press and Seal plastic wrap.  It can be found in the stores where the plastic wraps or tin foil is  kept.    2.  Some people take a bath and hang their leg/foot out of the tub.    3  Put your leg in a plastic bag and tape it on.         4. You can purchase a shower cover for casts at some pharmacies and through the Internet.

## 2021-06-21 NOTE — PROGRESS NOTES
Assessment:     Chronic pruritus without significant rash.  Previous biopsy suggested allergic contact dermatitis and Eczema.  Also xerosis given diffuse itching without visible rash.  Despite biopsy, pattern is not consistent with topical contact allergy.     Plan:  capsaicin topical every6-8 hours. Start with small area.    Continue with antihistamines as before.  Reluctant to use first generation antihistamines or antidepressants typically used with itch because of the drowsiness associated.  Follow up with dermatology    Start Eucrisa applied AM and PM to affected areas  ____________________________________________________________________________     Ariana comes in today for follow-up of itch and rash.  I saw her last on October 11.  At that time Capzasin topical was prescribed.  She tried it but did not like the burning sensation and also cause dryness to her skin.  She continues to be itchy.  Often is itching without visible rash although she does note rash especially on her back.  She can use topical steroid such as triamcinolone.  She is using Zyrtec 10 mg twice a day and Allegra 180 mg twice a day.  Benadryl makes her sleepy so she avoids it.  She did have some elevated tryptase levels but evaluation at [facility] was negative for mast cell disorder.  I do not have those records but we I was able to obtain dermatology records including biopsy from this year.  Biopsy was from September this year.  It shows chronic spongiotic dermatitis.  Suggesting allergic contact dermatitis and atopic dermatitis, nummular eczema or id reaction.    Physical Exam:  General:  Alert and Oriented.  Eyes:  Sclera clear. Nose: pale boggy mucosal membranes.  Skin: Diffusely dry skin.  On the back is rough skin with faint erythematous papular rash.    25 min spent in direct contact with the patient.  More than 50% in counseling and coordination of care.       Attending Attestation (For Attendings USE Only)...

## 2021-06-21 NOTE — PROGRESS NOTES
Assessment:    Chronic pruritus without significant rash.  Previous biopsy suggested allergic contact dermatitis.    Plan:    For now recommend capsaicin topical every6-8 hours. Start with small area.      Follow up in 3 weeks    Requested records from [place], dermatology.  Biopsy results.    Wait on patch testing  and further discussion of Dupixent for now.  Will review when she returns.  ____________________________________________________________________________     Ariana comes in today for evaluation of itch and rash.  This been going on for more than 1 year.  She describes diffuse itching all over.  Generally she has itchy skin without rash that develops redness when itched.  She has had a previous evaluation.  I do not have those medical records available today.  She reports having mast cell evaluation at [facility].  She reports negative evaluation for mast cell disorder.  She is also had a biopsy done by dermatology consultants.  She reports the biopsy was consistent with a contact allergy and dermatitis.  While at [facility] she was doing wet wraps which she continued at home for a while.  She also uses Vaseline for moisturizer morning and night.  She bathes regularly.  She avoids soap.  She uses Allegra in the morning and Zyrtec at night.  She uses Benadryl for sleep at night.  She uses topical Lidex 0.05% ointment daily to affected areas.  She is tried other topical steroids as well without significant benefit.  She reports that her symptoms are daily.  They seem to be getting worse.  She identifies certain rough fabrics as triggering symptoms.  Smooth fabrics just soak are much better tolerated.  She is tried eliminating foods without any significant improvement.  No symptoms of allergic rhinitis.  No recurrent fevers or infection.  No unexplained weight loss.    Review of symptoms:  As above, otherwise negative    Past medical history: Depression, GERD, hypothyroidism, narcolepsy,  osteoporosis    Allergies: Reviewed in chart.  No known allergy to latex.  Medication allergy listed is alendronate, Boniva, codeine, penicillin, morphine, lidocaine.    Family history: No known member of the family with allergy or asthma.    Social history: Recently just moved from [facility] to [facility].. No history of cigarette smoking.    Medications: reviewed in chart    Physical Exam:  General:  Alert and in no apparent distress.  Eyes:  Sclera clear.  Ears: TMs translucent grey with bony landmarks visible. Nose: Pale, boggy mucosal membranes.  Throat: Pink, moist.  No lesions.  Neck: Supple.  No lymphadenopathy.  Lungs: CTA.  CV: Regular rate and rhythm. Extremities: Well perfused.  No clubbing or cyanosis. Skin: diffuse maculopapular rash with scale.  There is sparing of her upper back.    45 min spent in direct contact with the patient.  More than 50% in counseling and coordination of care.

## 2021-06-29 ENCOUNTER — RECORDS - HEALTHEAST (OUTPATIENT)
Dept: LAB | Facility: CLINIC | Age: 86
End: 2021-06-29
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
## 2021-06-29 NOTE — PROGRESS NOTES
Progress Notes by Kemal Vivar DPM at 8/24/2020  1:30 PM     Author: Kemal Vivar DPM Service: -- Author Type: Physician    Filed: 8/24/2020  3:25 PM Encounter Date: 8/24/2020 Status: Signed    : Kemal Vivar DPM (Physician)       FOOT AND ANKLE SURGERY/PODIATRY CONSULT NOTE        ASSESSMENT:   Ulceration left leg         TREATMENT:  -The left leg ulceration is stable with mixed granular/fibrotic tissue. I discussed the principles of wound healing today including the importance of limited walking on the involved limb, good vascular perfusion, and the absence of infection. I will start her on santyl. Stable eschar dorsal right hallux and anterior right leg, will continue to monitor.     -After discussion of risk factors and consent obtained 2% Lidocaine HCL jelly was applied, under clean conditions, the leg and left ulceration(s) were debrided using currette.  Devitalized and nonviable tissue, along with any fibrin and slough, was removed to improve granulation tissue formation, stimulate wound healing, decrease overall bacteria load, disrupt biofilm formation and decrease edge senescence.  Total excisional debridement was 12.5 sq cm into the subcutaneous tissue with a depth of 0.2 cm.   Ulcers were improved afterwards and .  Measures were as noted on the flow sheet. A gauze dressing was applied.    -She will follow-up with me in 3 weeks.     Kemal Vivar DPM  [facility]        HPI: Ariana Felix was seen today for a sore on her left leg. She relates the sore started about three months ago after someone ran into her with a walker. She also has two areas on the right foot and right leg which have recently healed. Patient resides at an assisted living facility. Her son is present for today's exam.      Past Medical History:   Diagnosis Date   ? Acquired hypothyroidism 11/26/2018   ? Anxiety 12/27/2017   ? B12 deficiency 4/9/2013   ? BMI less than 19,adult  1/12/2015    Overview:  15.64   ? Chronic constipation 11/2/2018   ? Elevated serum tryptase 2/26/2018   ? 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 Fracture of unspecified part of left clavicle, initial encounter for closed fracture 11/12/2018   ? GERD (gastroesophageal reflux disease) 11/26/2018   ? Hypercholesterolemia 11/26/2018   ? Hyperparathyroidism (H) 4/15/2008   ? Hypokalemia 11/26/2018   ? Hyponatremia 12/19/2017   ? Lactose intolerance 11/26/2018   ? Major depression 11/26/2018   ? Mast cell activation, unspecified (H) 11/6/2017   ? Narcolepsy 11/26/2018   ? Osteoporosis 11/26/2018   ? Renal insufficiency 11/26/2018    Overview:  GFR 40   ? Severe protein-energy malnutrition (H) 12/27/2017    Overview:  Patient meets ASPEN criteria for severe malnutrition as evidenced by: generalized adipose wasting, muscle wasting evident in clavicular, dorsal hand and temporal regions, weight loss of greater than 10% in 1 month. BMI 15.64.   ? Varicose vein 11/26/2018    Overview:  painful   ? Zinc deficiency 4/30/2018       Past Surgical History:   Procedure Laterality Date   ? APPENDECTOMY     ? HIP SURGERY Right 2008   ? TONSILLECTOMY         Allergies   Allergen Reactions   ? Milk Cough   ? Codeine Other (See Comments)     Hx of narcolepsy   ? Ibandronate Other (See Comments)     pain   ? Morphine Other (See Comments)     swelling   ? Adhesive Tape-Silicones Rash   ? Alendronate Other (See Comments) and Rash     esophagitis  esophagitis   ? Banana Extract Rash   ? Bee Venom Protein (Honey Bee) Other (See Comments)     Swelling   ? Citrus And Derivatives Rash   ? Cocoa Rash   ? Gluten Rash   ? Kiwi Rash   ? Latex Rash   ? Lidocaine Rash     Patch    ? Other Allergy (See Comments) Rash   ? Penicillins Rash and Other (See Comments)     UNKNOWN - HAD REACTION IN THE PAST   ? Preservative Rash     PRESERVATIVES   ? Spice Flavor Rash     spices          Current Outpatient Medications:   ?  acetaminophen (TYLENOL) 500 MG tablet, Take by mouth., Disp: , Rfl:   ?  calcium, as carbonate, (OS-JAVIER) 500 mg calcium (1,250 mg) tablet, Take by mouth., Disp: , Rfl:   ?  capsaicin (ZOSTRIX) 0.025 % cream, Apply to affected areas every 6-8 hours as needed, Disp: 60 g, Rfl: 3  ?  cetirizine (ZYRTEC) 10 MG chewable tablet, Chew 10 mg., Disp: , Rfl:   ?  citalopram (CELEXA) 20 MG tablet, Take 20 mg by mouth., Disp: , Rfl:   ?  clobetasol (TEMOVATE) 0.05 % external solution, JANN EXT AA D HS, Disp: , Rfl:   ?  collagenase (SANTYL) ointment, Apply qd, Disp: 15 g, Rfl: 2  ?  crisaborole (EUCRISA) 2 % Oint, Apply 1 application topically 2 (two) times a day., Disp: 60 g, Rfl: 6  ?  cromolyn (GASTROCROM) 100 mg/5 mL solution, Use 5 mL oral solution 2 times daily, Disp: , Rfl:   ?  docusate sodium (COLACE) 100 MG capsule, Take 1 capsule by mouth 3 times daily with meals, Disp: , Rfl:   ?  emollient (VANICREAM) Crea, Apply topically., Disp: , Rfl:   ?  fluocinonide (LIDEX) 0.05 % ointment, Apply topically., Disp: , Rfl:   ?  furosemide (LASIX) 20 MG tablet, Take 20 mg by mouth., Disp: , Rfl:   ?  HYDROcodone-acetaminophen 5-325 mg per tablet, Take 0.5 tablets by mouth every 4 (four) hours as needed for pain., Disp: 10 tablet, Rfl: 0  ?  K-EFFERVESCENT 25 mEq disintegrating tablet, , Disp: , Rfl:   ?  Lacto.acidophilus-Bif.animalis (PROBIOTIC) 5 billion cell CpSP, , Disp: , Rfl:   ?  levothyroxine (SYNTHROID, LEVOTHROID) 25 MCG tablet, 10 mcg., Disp: , Rfl:   ?  linaclotide (LINZESS) 290 mcg cap capsule, , Disp: , Rfl:   ?  methylphenidate HCl (RITALIN) 10 MG tablet, Take 20 mg by mouth., Disp: , Rfl:   ?  potassium chloride (MICRO-K) 10 mEq CR capsule, Take 20 mEq by mouth., Disp: , Rfl:   ?  ranitidine (ZANTAC) 150 MG tablet, Take 150 mg by mouth., Disp: , Rfl:   ?  rivaroxaban (XARELTO) 15 mg Tab, Take 15 mg by mouth., Disp: , Rfl:   ?  senna-docusate (PERICOLACE) 8.6-50 mg  "tablet, Take by mouth., Disp: , Rfl:   ?  syringe with needle (BD LUER-TRACEE SYRINGE) 3 mL 25 x 5/8\" Syrg, USE FOR B12 INJECTION MONTHLY, Disp: , Rfl:   ?  tamsulosin (FLOMAX) 0.4 mg cap, , Disp: , Rfl:   ?  zinc 50 mg Tab, Take 1 tablet by mouth., Disp: , Rfl:     Current Facility-Administered Medications:   ?  lidocaine 2 % jelly (XYLOCAINE), , Topical, PRN, Kemal Vivar DPM    Past Surgical History:   Procedure Laterality Date   ? APPENDECTOMY     ? HIP SURGERY Right 2008   ? TONSILLECTOMY         Social History     Social History Narrative   ? Not on file       Family History   Problem Relation Age of Onset   ? Stomach cancer Mother    ? Colon cancer Father        Review of Systems - 10 point Review of Systems is negative except for left leg ulcer which is noted in HPI.      OBJECTIVE:  Appearance: alert, well appearing, and in no distress.    Vitals:    08/24/20 1344   BP: 106/50   Pulse: 88   Resp: 14   Temp: 98.1  F (36.7  C)       BMI= Body mass index is 17.1 kg/m .    General appearance: Patient is alert and fully cooperative with history & exam.  No sign of distress is noted during the visit.     Psychiatric: Affect is pleasant & appropriate.  Patient appears motivated to improve health.     Respiratory: Breathing is regular & unlabored while sitting.     HEENT: Hearing is intact to spoken word.  Speech is clear.  No gross evidence of visual impairment that would impact ambulation.        Vascular: Dorsalis pedis palpableBilateral.  Dermatologic:   VASC Wound 08/24/20 left posterior leg (Active)   Pre Size Length 5 08/24/20 1300   Pre Size Width 2.5 08/24/20 1300   Pre Size Depth 0.1 08/24/20 1300   Pre Total Sq cm 12.5 08/24/20 1300       VASC Wound 08/24/20 right leg (Active)   Pre Size Length 1.5 08/24/20 1300   Pre Size Width 1 08/24/20 1300   Pre Total Sq cm 1.5 08/24/20 1300   Description scab 08/24/20 1300       Wound 11/12/18 Other wound type (comment) Other (Comment) (Active)   Stable eschar " dorsal right hallux and anterior right leg. Mixed granular/fibrotic tissue ulceration posterior left leg, does not probe to deep tissues. No erythema bilateral.   Neurologic: Diminished to light touch Bilateral.  Musculoskeletal: Contracted digits noted Bilateral.    Imaging:     No results found.         Picture:

## 2021-06-30 LAB — POTASSIUM BLD-SCNC: 2.8 MMOL/L (ref 3.5–5)

## 2021-09-27 ENCOUNTER — LAB REQUISITION (OUTPATIENT)
Dept: LAB | Facility: CLINIC | Age: 86
End: 2021-09-27
Payer: COMMERCIAL

## 2021-09-27 DIAGNOSIS — I10 ESSENTIAL (PRIMARY) HYPERTENSION: ICD-10-CM

## 2021-09-28 LAB
ANION GAP SERPL CALCULATED.3IONS-SCNC: 12 MMOL/L (ref 5–18)
BUN SERPL-MCNC: 30 MG/DL (ref 8–28)
CALCIUM SERPL-MCNC: 9.6 MG/DL (ref 8.5–10.5)
CHLORIDE BLD-SCNC: 106 MMOL/L (ref 98–107)
CO2 SERPL-SCNC: 25 MMOL/L (ref 22–31)
CREAT SERPL-MCNC: 0.97 MG/DL (ref 0.6–1.1)
GFR SERPL CREATININE-BSD FRML MDRD: 52 ML/MIN/1.73M2
GLUCOSE BLD-MCNC: 83 MG/DL (ref 70–125)
POTASSIUM BLD-SCNC: 4.1 MMOL/L (ref 3.5–5)
SODIUM SERPL-SCNC: 143 MMOL/L (ref 136–145)

## 2021-09-28 PROCEDURE — 80048 BASIC METABOLIC PNL TOTAL CA: CPT | Mod: ORL | Performed by: INTERNAL MEDICINE

## 2021-09-28 PROCEDURE — P9604 ONE-WAY ALLOW PRORATED TRIP: HCPCS | Mod: ORL | Performed by: INTERNAL MEDICINE

## 2021-09-28 PROCEDURE — 36415 COLL VENOUS BLD VENIPUNCTURE: CPT | Mod: ORL | Performed by: INTERNAL MEDICINE

## 2021-11-24 ENCOUNTER — LAB REQUISITION (OUTPATIENT)
Dept: LAB | Facility: CLINIC | Age: 86
End: 2021-11-24
Payer: MEDICARE

## 2021-11-24 DIAGNOSIS — I10 ESSENTIAL (PRIMARY) HYPERTENSION: ICD-10-CM

## 2021-11-26 LAB
ANION GAP SERPL CALCULATED.3IONS-SCNC: 12 MMOL/L (ref 5–18)
BUN SERPL-MCNC: 27 MG/DL (ref 8–28)
CALCIUM SERPL-MCNC: 9.7 MG/DL (ref 8.5–10.5)
CHLORIDE BLD-SCNC: 95 MMOL/L (ref 98–107)
CO2 SERPL-SCNC: 33 MMOL/L (ref 22–31)
CREAT SERPL-MCNC: 0.97 MG/DL (ref 0.6–1.1)
GFR SERPL CREATININE-BSD FRML MDRD: 52 ML/MIN/1.73M2
GLUCOSE BLD-MCNC: 76 MG/DL (ref 70–125)
POTASSIUM BLD-SCNC: 3.1 MMOL/L (ref 3.5–5)
SODIUM SERPL-SCNC: 140 MMOL/L (ref 136–145)

## 2021-11-26 PROCEDURE — 36415 COLL VENOUS BLD VENIPUNCTURE: CPT | Mod: ORL | Performed by: INTERNAL MEDICINE

## 2021-11-26 PROCEDURE — 80048 BASIC METABOLIC PNL TOTAL CA: CPT | Mod: ORL | Performed by: INTERNAL MEDICINE

## 2021-11-26 PROCEDURE — P9604 ONE-WAY ALLOW PRORATED TRIP: HCPCS | Mod: ORL | Performed by: INTERNAL MEDICINE

## 2022-01-05 ENCOUNTER — LAB REQUISITION (OUTPATIENT)
Dept: LAB | Facility: CLINIC | Age: 87
End: 2022-01-05
Payer: MEDICARE

## 2022-01-05 DIAGNOSIS — R41.82 ALTERED MENTAL STATUS, UNSPECIFIED: ICD-10-CM

## 2022-01-09 ENCOUNTER — LAB REQUISITION (OUTPATIENT)
Dept: LAB | Facility: CLINIC | Age: 87
End: 2022-01-09
Payer: COMMERCIAL

## 2022-01-09 DIAGNOSIS — R41.82 ALTERED MENTAL STATUS, UNSPECIFIED: ICD-10-CM

## 2022-01-11 ENCOUNTER — LAB REQUISITION (OUTPATIENT)
Dept: LAB | Facility: CLINIC | Age: 87
End: 2022-01-11
Payer: COMMERCIAL

## 2022-01-11 DIAGNOSIS — R41.0 DISORIENTATION, UNSPECIFIED: ICD-10-CM

## 2022-01-11 DIAGNOSIS — R30.0 DYSURIA: ICD-10-CM
